# Patient Record
Sex: MALE | Race: WHITE | Employment: UNEMPLOYED | URBAN - METROPOLITAN AREA
[De-identification: names, ages, dates, MRNs, and addresses within clinical notes are randomized per-mention and may not be internally consistent; named-entity substitution may affect disease eponyms.]

---

## 2017-11-27 ENCOUNTER — HOSPITAL ENCOUNTER (EMERGENCY)
Age: 32
Discharge: HOME | End: 2017-11-27
Attending: EMERGENCY MEDICINE | Admitting: EMERGENCY MEDICINE
Payer: SELF-PAY

## 2017-11-27 VITALS
DIASTOLIC BLOOD PRESSURE: 90 MMHG | RESPIRATION RATE: 15 BRPM | TEMPERATURE: 98.2 F | HEART RATE: 77 BPM | HEIGHT: 70 IN | BODY MASS INDEX: 24.34 KG/M2 | SYSTOLIC BLOOD PRESSURE: 138 MMHG | WEIGHT: 170 LBS | OXYGEN SATURATION: 98 %

## 2017-11-27 DIAGNOSIS — H60.501 ACUTE OTITIS EXTERNA OF RIGHT EAR, UNSPECIFIED TYPE: Primary | ICD-10-CM

## 2017-11-27 DIAGNOSIS — H72.91 RUPTURED TYMPANIC MEMBRANE, RIGHT: ICD-10-CM

## 2017-11-27 PROCEDURE — 99282 EMERGENCY DEPT VISIT SF MDM: CPT

## 2017-11-27 RX ORDER — CIPROFLOXACIN 500 MG/1
500 TABLET ORAL 2 TIMES DAILY
Qty: 14 TAB | Refills: 0 | Status: SHIPPED | OUTPATIENT
Start: 2017-11-27 | End: 2017-11-27

## 2017-11-27 RX ORDER — AMOXICILLIN AND CLAVULANATE POTASSIUM 875; 125 MG/1; MG/1
1 TABLET, FILM COATED ORAL 2 TIMES DAILY
Qty: 14 TAB | Refills: 0 | Status: SHIPPED | OUTPATIENT
Start: 2017-11-27 | End: 2017-12-04

## 2017-11-27 RX ORDER — OFLOXACIN 3 MG/ML
5 SOLUTION AURICULAR (OTIC) 2 TIMES DAILY
Qty: 5 ML | Refills: 0 | Status: SHIPPED | OUTPATIENT
Start: 2017-11-27 | End: 2017-12-04

## 2017-11-27 RX ORDER — FLUOXETINE HYDROCHLORIDE 20 MG/1
CAPSULE ORAL
Refills: 3 | COMMUNITY
Start: 2017-08-20

## 2017-11-27 RX ORDER — NEOMYCIN SULFATE, POLYMYXIN B SULFATE AND HYDROCORTISONE 10; 3.5; 1 MG/ML; MG/ML; [USP'U]/ML
SUSPENSION/ DROPS AURICULAR (OTIC)
Refills: 0 | COMMUNITY
Start: 2017-11-26 | End: 2017-11-27 | Stop reason: SINTOL

## 2017-11-27 NOTE — DISCHARGE INSTRUCTIONS
Perforated Eardrum: Care Instructions  Your Care Instructions    A tear or hole in the membrane of the middle ear is called a perforated or ruptured eardrum. This can happen if an infection builds up inside the ear or if the eardrum gets injured. You may find it hard to hear out of that ear or may hear a buzzing sound. You may have an earache or have fluids that drain from the ear. Your eardrum should heal on its own in a few weeks, and you should hear normally then. If you have an infection, your doctor may prescribe antibiotics. You may need pain relief medicine for your earache. Your doctor will check to see if your eardrum has healed. If not, you may need surgery to repair the eardrum. Follow-up care is a key part of your treatment and safety. Be sure to make and go to all appointments, and call your doctor if you are having problems. It's also a good idea to know your test results and keep a list of the medicines you take. How can you care for yourself at home? · If your doctor prescribed antibiotics, take them as directed. Do not stop taking them just because you feel better. You need to take the full course of antibiotics. · Take an over-the-counter pain medicine, such as acetaminophen (Tylenol), ibuprofen (Advil, Motrin), or naproxen (Aleve), as needed. Read and follow all instructions on the label. · Do not take two or more pain medicines at the same time unless the doctor told you to. Many pain medicines have acetaminophen, which is Tylenol. Too much acetaminophen (Tylenol) can be harmful. · To ease pain, put a warm washcloth or a heating pad set on low on your ear. You may have some drainage from the ear. · Be careful when taking over-the-counter cold or flu medicines and Tylenol at the same time. Many of these medicines have acetaminophen, which is Tylenol. Read the labels to make sure that you are not taking more than the recommended dose. Too much Tylenol can be harmful.   · Keep your ears dry.  ¨ Take baths until your doctor says you can take showers again. ¨ When you wash your hair, use cotton lightly coated with petroleum jelly as an earplug. Do not use plastic earplugs. ¨ Do not swim until your doctor says you can. ¨ If you get water in your ears, turn your head to each side and pull the earlobe in different directions. This will help the water run out. If your ears are still wet, use a hair dryer set on the lowest heat. Hold the dryer several inches from your ear. · Do not put anything into your ear canal. For example, do not use a cotton swab to clean the inside of your ear. It can damage your ear. If you think you have something inside your ear, ask your doctor to check it. When should you call for help? Call your doctor now or seek immediate medical care if:  ? · You have signs of infection, such as:  ¨ Increased pain, swelling, warmth, or redness. ¨ Pus draining from the ear. ¨ A fever. ? Watch closely for changes in your health, and be sure to contact your doctor if:  ? · You have changes in hearing. ? · You do not get better as expected. Where can you learn more? Go to http://kadeem-helen.info/. Enter U006 in the search box to learn more about \"Perforated Eardrum: Care Instructions. \"  Current as of: May 12, 2017  Content Version: 11.4  © 3979-9315 [employer]. Care instructions adapted under license by [employer] (which disclaims liability or warranty for this information). If you have questions about a medical condition or this instruction, always ask your healthcare professional. [place] [address] any warranty or liability for your use of this information. Swimmer's Ear: Care Instructions  Your Care Instructions    Swimmer's ear (otitis externa) is inflammation or infection of the ear canal. This is the passage that leads from the outer ear to the eardrum.  Any water, sand, or other debris that gets into the ear canal and stays there can cause swimmer's ear. Putting cotton swabs or other items in the ear to clean it can also cause this problem. Swimmer's ear can be very painful. But you can treat the pain and infection with medicines. You should feel better in a few days. Follow-up care is a key part of your treatment and safety. Be sure to make and go to all appointments, and call your doctor if you are having problems. It's also a good idea to know your test results and keep a list of the medicines you take. How can you care for yourself at home? Cleaning and care  · Use antibiotic drops as your doctor directs. · Do not insert ear drops (other than the antibiotic ear drops) or anything else into the ear unless your doctor has told you to. · Avoid getting water in the ear until the problem clears up. Use cotton lightly coated with petroleum jelly as an earplug. Do not use plastic earplugs. · Use a hair dryer set on low to carefully dry the ear after you shower. · To ease ear pain, hold a warm washcloth against your ear. · Take pain medicines exactly as directed. ¨ If the doctor gave you a prescription medicine for pain, take it as prescribed. ¨ If you are not taking a prescription pain medicine, ask your doctor if you can take an over-the-counter medicine. Inserting ear drops  · Warm the drops to body temperature by rolling the container in your hands. Or you can place it in a cup of warm water for a few minutes. · Lie down, with your ear facing up. · Place drops inside the ear. Follow your doctor's instructions (or the directions on the label) for how many drops to use. Gently wiggle the outer ear or pull the ear up and back to help the drops get into the ear. · It's important to keep the liquid in the ear canal for 3 to 5 minutes. When should you call for help? Call your doctor now or seek immediate medical care if:  ? · You have a new or higher fever.    ? · You have new or worse pain, swelling, warmth, or redness around or behind your ear. ? · You have new or increasing pus or blood draining from your ear. ? Watch closely for changes in your health, and be sure to contact your doctor if:  ? · You are not getting better after 2 days (48 hours). Where can you learn more? Go to http://kadeem-helen.info/. Enter C706 in the search box to learn more about \"Swimmer's Ear: Care Instructions. \"  Current as of: May 12, 2017  Content Version: 11.4  © 3630-2804 [employer]. Care instructions adapted under license by [employer] (which disclaims liability or warranty for this information). If you have questions about a medical condition or this instruction, always ask your healthcare professional. [address] any warranty or liability for your use of this information.

## 2017-11-27 NOTE — ED PROVIDER NOTES
HPI Comments: Patient is a 34year old male with a PMH of subarachnoid bleeding & substance abuse who comes the the Ed today via private vehicle escorted by a parent with complaints of right ear pain. Patient states he has had a reduction in hearing to his right ear for at least 1 month. Over the last few days he had developed pain in the right ear. She was seen at [facility] yesterday. He had bilateral cerumen impactions. The provider removed the wax from the right ear canal and started him on neomycin gtts. Since the wax removal he has had blood draining from the ear and is spitting up blood clots. He called [facility] who sent him into the ED for an evaluation. He denies fevers, nausea or vomiting. He has had some mild chills. He has no further complaints at this time. PCP: Rickie Cabello MD      The history is provided by the patient and a parent. Past Medical History:   Diagnosis Date    Agoraphobia with panic disorder     Hypertension     Panic disorder     SA[facility]ach[facility]oid hemorrhage) ([facility] [address] 2008    Due to trauma    Tension headache     TMJ dysfunction     Bilaterally       History reviewed. No pertinent surgical history.       Family History:   Problem Relation Age of Onset    Migraines Mother     Cancer Maternal Grandfather     Neuropathy Paternal Grandmother     Cancer Paternal Grandfather     Thyroid Disease Father        Social History     Social History    Marital status: SINGLE     Spouse name: N/A    Number of children: N/A    Years of education: N/A     Occupational History    Power washing/mobile detailing      Social History Main Topics    Smoking status: Current Every Day Smoker     Packs/day: 1.00     Years: 19.00     Types: Cigarettes    Smokeless tobacco: Not on file    Alcohol use 2.4 - 6.0 oz/week     4 - 10 Cans of beer per week    Drug use: Yes      Comment: Heroin addict in past, cocaine, drug-free >7 years, stopped methadone 4 months ago    Sexual activity: Not on file     Other Topics Concern    Not on file     Social History Narrative    Lives in [place] with Dad         ALLERGIES: Erythromycin and Penicillins    Review of Systems   HENT: Positive for ear discharge, ear pain, hearing loss and sore throat. Negative for dental problem, drooling, facial swelling and mouth sores. All other systems reviewed and are negative. Vitals:    11/27/17 1054   BP: 138/90   Pulse: 77   Resp: 15   Temp: 98.2 °F (36.8 °C)   SpO2: 98%   Weight: 77.1 kg (170 lb)   Height: 5' 10\" (1.778 m)            Physical Exam   Constitutional: He appears well-developed and well-nourished. HENT:   Head: Normocephalic and atraumatic. Right Ear: Right ear exhibits lacerations. There is drainage and tenderness. No mastoid tenderness. Tympanic membrane is perforated. Decreased hearing is noted. Left Ear: Hearing normal.   Ears:    Mouth/Throat: Uvula is midline and mucous membranes are normal. Dental caries present. Posterior oropharyngeal erythema present. No oropharyngeal exudate or posterior oropharyngeal edema. Neck: Trachea normal, normal range of motion and phonation normal. Neck supple. No edema, no erythema and normal range of motion present. Lymphadenopathy:        Head (right side): No submental, no submandibular, no tonsillar, no preauricular, no posterior auricular and no occipital adenopathy present. Head (left side): No submental, no submandibular, no tonsillar, no preauricular, no posterior auricular and no occipital adenopathy present. He has no cervical adenopathy. Nursing note and vitals reviewed.        MDM  Number of Diagnoses or Management Options  Acute otitis externa of right ear, unspecified type:   Ruptured tympanic membrane, right:   Diagnosis management comments: Assessment & Plan:     STOP neomycin gtts  Start ofloxacin ear gtts  Cipro 500mg PO for ruptured TM and ear pain    Seen by & Discussed with Dr. Arron Singh, MD Martin Mcdermott, NP  17  11:22 AM    11:33 AM  The patient has been reevaluated. The patient is ready for discharge. The patient's signs, symptoms, diagnosis, and discharge instructions have been discussed and the patient/ family has conveyed their understanding. The patient is to follow up as recommended or return to the ED should their symptoms worsen. Plan has been discussed and the patient is in agreement. LABORATORY TESTS:  No results found for this or any previous visit (from the past 12 hour(s)). IMAGING RESULTS:  No orders to display  No results found. MEDICATIONS GIVEN:  Medications - No data to display    IMPRESSION:  Acute otitis externa of right ear, unspecified type  (primary encounter diagnosis)  Ruptured tympanic membrane, right    PLAN:  1. Current Discharge Medication List    START taking these medications    ofloxacin (FLOXIN) 0.3 % otic solution  Administer 5 Drops in right ear two (2) times a day for 7 days. Indications: Otitis Externa  Qty: 5 mL Refills: 0    ciprofloxacin HCl (CIPRO) 500 mg tablet  Take 1 Tab by mouth two (2) times a day for 7 days. Indications: Otitis externa and media  Qty: 14 Tab Refills: 0      CONTINUE these medications which have NOT CHANGED    FLUoxetine (PROZAC) 20 mg capsule  TK ONE C PO  QAM  Refills: 3    ibuprofen (MOTRIN) 200 mg tablet  Take  by mouth.    clonazePAM (KLONOPIN) 1 mg tablet  Take  by mouth two (2) times a day. STOP taking these medications    neomycin-polymyxin-hydrocortisone, buffered, (PEDIOTIC) 3.5-10,000-1 mg/mL-unit/mL-% otic suspension  Comments:  Reason for Stoppin.  Follow-up Information     Follow up With Details Comments [facility] Schedule an appointment as soon as possible   for a visit in 3 days for a re-evaluation of the right ear [address]  [address] MD  As needed, If symptoms worsen [address] [place] [id number]  [phone]      [address] [facility]  As needed, If symptoms worsen [address] [facility]  [phone]        Return to ED for new or worsening symptoms       [place], NP      Pharmacy called  Drug: Cipro can prolong the QTc in patient's on methadone/   Will change to: Augmentin 875mg. Discussed the PCN allergy when the patient was in the ED. He had a few blisters on his ankle after taking PCN for dental work. No true drug rash.  Will try the Augmentin for the otitis media  New Si tab PO BID x7 days    Millicent Montoya NP  17  2:40 PM        ED Course       Procedures

## 2017-11-27 NOTE — ED TRIAGE NOTES
\"I had my right ear irrigated at [facility] yesterday. It was extremely painful. Today, it's there's blood coming out of my ear and I feel off balance\".

## 2023-05-25 RX ORDER — IBUPROFEN 200 MG
TABLET ORAL
COMMUNITY

## 2023-05-25 RX ORDER — FLUOXETINE HYDROCHLORIDE 20 MG/1
CAPSULE ORAL
COMMUNITY
Start: 2017-08-20

## 2023-05-25 RX ORDER — CLONAZEPAM 1 MG/1
TABLET ORAL 2 TIMES DAILY
COMMUNITY